# Patient Record
Sex: MALE | Race: AMERICAN INDIAN OR ALASKA NATIVE | URBAN - METROPOLITAN AREA
[De-identification: names, ages, dates, MRNs, and addresses within clinical notes are randomized per-mention and may not be internally consistent; named-entity substitution may affect disease eponyms.]

---

## 2017-10-20 ENCOUNTER — NEW PATIENT (OUTPATIENT)
Dept: URBAN - METROPOLITAN AREA CLINIC 64 | Facility: CLINIC | Age: 27
End: 2017-10-20
Payer: COMMERCIAL

## 2017-10-20 PROCEDURE — 92004 COMPRE OPH EXAM NEW PT 1/>: CPT | Performed by: OPTOMETRIST

## 2017-10-20 PROCEDURE — 92015 DETERMINE REFRACTIVE STATE: CPT | Performed by: OPTOMETRIST

## 2017-10-20 ASSESSMENT — INTRAOCULAR PRESSURE
OD: 12
OS: 12

## 2017-10-20 ASSESSMENT — KERATOMETRY
OD: 42.69
OS: 42.90

## 2017-10-20 ASSESSMENT — VISUAL ACUITY
OD: 20/20
OS: 20/20

## 2018-10-22 ENCOUNTER — FOLLOW UP ESTABLISHED (OUTPATIENT)
Dept: URBAN - METROPOLITAN AREA CLINIC 64 | Facility: CLINIC | Age: 28
End: 2018-10-22
Payer: COMMERCIAL

## 2018-10-22 PROCEDURE — 92015 DETERMINE REFRACTIVE STATE: CPT | Performed by: OPTOMETRIST

## 2018-10-22 PROCEDURE — 92014 COMPRE OPH EXAM EST PT 1/>: CPT | Performed by: OPTOMETRIST

## 2018-10-22 ASSESSMENT — INTRAOCULAR PRESSURE
OS: 13
OD: 12

## 2018-10-22 ASSESSMENT — VISUAL ACUITY
OS: 20/20
OD: 20/20

## 2019-10-25 ENCOUNTER — FOLLOW UP ESTABLISHED (OUTPATIENT)
Dept: URBAN - METROPOLITAN AREA CLINIC 64 | Facility: CLINIC | Age: 29
End: 2019-10-25
Payer: COMMERCIAL

## 2019-10-25 PROCEDURE — 92014 COMPRE OPH EXAM EST PT 1/>: CPT | Performed by: OPTOMETRIST

## 2019-10-25 PROCEDURE — 92015 DETERMINE REFRACTIVE STATE: CPT | Performed by: OPTOMETRIST

## 2019-10-25 ASSESSMENT — INTRAOCULAR PRESSURE
OD: 15
OS: 13

## 2019-10-25 ASSESSMENT — KERATOMETRY
OD: 42.67
OS: 42.98

## 2019-10-25 ASSESSMENT — VISUAL ACUITY
OD: 20/20
OS: 20/20

## 2019-11-15 ENCOUNTER — FOLLOW UP ESTABLISHED (OUTPATIENT)
Dept: URBAN - METROPOLITAN AREA CLINIC 64 | Facility: CLINIC | Age: 29
End: 2019-11-15

## 2019-11-15 PROCEDURE — 92310 CONTACT LENS FITTING OU: CPT | Performed by: OPTOMETRIST

## 2019-11-27 ENCOUNTER — FOLLOW UP ESTABLISHED (OUTPATIENT)
Dept: URBAN - METROPOLITAN AREA CLINIC 64 | Facility: CLINIC | Age: 29
End: 2019-11-27

## 2019-11-27 PROCEDURE — 92310 CONTACT LENS FITTING OU: CPT | Performed by: OPTOMETRIST

## 2020-11-19 ENCOUNTER — FOLLOW UP ESTABLISHED (OUTPATIENT)
Dept: URBAN - METROPOLITAN AREA CLINIC 64 | Facility: CLINIC | Age: 30
End: 2020-11-19
Payer: COMMERCIAL

## 2020-11-19 PROCEDURE — 92014 COMPRE OPH EXAM EST PT 1/>: CPT | Performed by: OPTOMETRIST

## 2020-11-19 PROCEDURE — 92015 DETERMINE REFRACTIVE STATE: CPT | Performed by: OPTOMETRIST

## 2020-11-19 ASSESSMENT — INTRAOCULAR PRESSURE
OS: 15
OD: 16

## 2020-11-19 ASSESSMENT — KERATOMETRY
OD: 42.69
OS: 43.09

## 2020-11-19 ASSESSMENT — VISUAL ACUITY
OD: 20/20
OS: 20/20

## 2021-11-05 ENCOUNTER — OFFICE VISIT (OUTPATIENT)
Dept: URBAN - METROPOLITAN AREA CLINIC 64 | Facility: CLINIC | Age: 31
End: 2021-11-05
Payer: COMMERCIAL

## 2021-11-05 DIAGNOSIS — H04.123 TEAR FILM INSUFFICIENCY OF BILATERAL LACRIMAL GLANDS: ICD-10-CM

## 2021-11-05 DIAGNOSIS — H52.13 MYOPIA, BILATERAL: Primary | ICD-10-CM

## 2021-11-05 PROCEDURE — 92014 COMPRE OPH EXAM EST PT 1/>: CPT | Performed by: OPTOMETRIST

## 2021-11-05 RX ORDER — PREDNISOLONE ACETATE 10 MG/ML
1 % SUSPENSION/ DROPS OPHTHALMIC
Qty: 1 | Refills: 0 | Status: ACTIVE
Start: 2021-11-05

## 2021-11-05 ASSESSMENT — INTRAOCULAR PRESSURE
OS: 14
OD: 14

## 2021-11-05 ASSESSMENT — VISUAL ACUITY
OS: 20/20
OD: 20/20

## 2021-11-05 ASSESSMENT — KERATOMETRY
OD: 42.76
OS: 43.13

## 2021-11-05 NOTE — IMPRESSION/PLAN
Impression: Tear film insufficiency of bilateral lacrimal glands: H04.123. Plan: Removing mucous OS all day. Mucous fishing syndrome. Recommend pred qid OS for 2 weeks. Use systane 6 X a day OU. Try not to put finger in the left eye. He reports he does favor sleeping on left side but sleeps in all different positions.

## 2023-10-24 ENCOUNTER — OFFICE VISIT (OUTPATIENT)
Dept: URBAN - METROPOLITAN AREA CLINIC 64 | Facility: LOCATION | Age: 33
End: 2023-10-24
Payer: COMMERCIAL

## 2023-10-24 DIAGNOSIS — H52.13 MYOPIA, BILATERAL: Primary | ICD-10-CM

## 2023-10-24 PROCEDURE — 92014 COMPRE OPH EXAM EST PT 1/>: CPT

## 2023-10-24 ASSESSMENT — VISUAL ACUITY
OD: 20/20
OS: 20/20

## 2023-10-24 ASSESSMENT — INTRAOCULAR PRESSURE
OD: 14
OS: 14